# Patient Record
Sex: FEMALE | Race: WHITE | Employment: UNEMPLOYED | URBAN - METROPOLITAN AREA
[De-identification: names, ages, dates, MRNs, and addresses within clinical notes are randomized per-mention and may not be internally consistent; named-entity substitution may affect disease eponyms.]

---

## 2023-02-08 ENCOUNTER — HOSPITAL ENCOUNTER (EMERGENCY)
Age: 23
Discharge: HOME | End: 2023-02-09
Attending: EMERGENCY MEDICINE
Payer: COMMERCIAL

## 2023-02-08 VITALS
TEMPERATURE: 98.2 F | DIASTOLIC BLOOD PRESSURE: 70 MMHG | BODY MASS INDEX: 24.74 KG/M2 | WEIGHT: 148.5 LBS | RESPIRATION RATE: 18 BRPM | OXYGEN SATURATION: 100 % | SYSTOLIC BLOOD PRESSURE: 121 MMHG | HEART RATE: 101 BPM | HEIGHT: 65 IN

## 2023-02-08 DIAGNOSIS — Z86.59 HISTORY OF PANIC ATTACKS: Primary | ICD-10-CM

## 2023-02-08 DIAGNOSIS — F41.0 COMPLAINT OF PANIC ATTACK: ICD-10-CM

## 2023-02-08 PROCEDURE — 74011250637 HC RX REV CODE- 250/637: Performed by: PHYSICIAN ASSISTANT

## 2023-02-08 PROCEDURE — 99283 EMERGENCY DEPT VISIT LOW MDM: CPT

## 2023-02-08 RX ORDER — LORAZEPAM 1 MG/1
1 TABLET ORAL
Status: COMPLETED | OUTPATIENT
Start: 2023-02-08 | End: 2023-02-08

## 2023-02-08 RX ADMIN — LORAZEPAM 1 MG: 1 TABLET ORAL at 22:29

## 2023-02-08 NOTE — Clinical Note
[facility] EMERGENCY DEPT  [address] [facility] [id number] [phone]    Work/School Note    Date: 2/8/2023    To Whom It May concern:    Deangelo Landon was seen and treated today in the emergency room by the following provider(s):  Attending Provider: Rashmi Baldwin MD  Physician Assistant: Susu Landaverde, [facility]. Deangelo Landon is excused from work/school on 02/08/23 and 02/09/23. She is medically clear to return to work/school on 2/10/2023.        Sincerely,          TERENCE Allen

## 2023-02-08 NOTE — Clinical Note
[facility] EMERGENCY DEPT  [address] [facility] [id number] [phone]    Work/School Note    Date: 2/8/2023    To Whom It May concern:    Tory Younger was seen and treated today in the emergency room by the following provider(s):  Attending Provider: Nancy Esteves MD  Physician Assistant: Donna Hazel. Tory Younger is excused from work/school on 02/08/23 and 02/09/23. She is medically clear to return to work/school on 2/10/2023.        Sincerely,          TERENCE Cadet

## 2023-02-09 PROCEDURE — 90791 PSYCH DIAGNOSTIC EVALUATION: CPT

## 2023-02-09 NOTE — ED TRIAGE NOTES
Pt into ED ambulatory c/o increased panic attacks, states she gets palpitations and has trouble breathing when they appear. Pt reports she takes hydroxyzine PRN, last dose 2 hours PTA. PT speaking in complete sentences in triage.

## 2023-02-09 NOTE — ED NOTES
Pt present to ED today with CC of anxiety attacks occurring approx weekly for past 4 months. Pt reports that recently the anxiety attacks have been more frequent with pt experiencing 3 anxiety attacks this week. Pt reports taking hydroxazine at approx 1830 without receiving desired results. Pt denies SI/HI and AH/VH. Pt states, \"I'm actually the opposite of suicidal as I'm fearful of dying. \"    Pt aox4, answering questions appropriately. Pt HR slightly tachy at 101. Pt soft spoken, appears anxious, flat and withdrawn. No abnormal findings with physical assessment other than tachy HR. No acute s/sx of distress, roommate a bedside and supportive. Emergency Department Nursing Plan of Care       The Nursing Plan of Care is developed from the Nursing assessment and Emergency Department Attending provider initial evaluation. The plan of care may be reviewed in the ED Provider note.     The Plan of Care was developed with the following considerations:   Patient / Family readiness to learn indicated by:verbalized understanding  Persons(s) to be included in education: patient  Barriers to Learning/Limitations:No    Signed     Dierdre Clyde    2/8/2023   10:35 PM

## 2023-02-09 NOTE — ED PROVIDER NOTES
[facility] EMERGENCY DEPT  EMERGENCY DEPARTMENT ENCOUNTER       Pt Name: Vinicius Villagomez  MRN: [id number]  [place] 2000  Date of evaluation: 2/8/2023  Provider: TERENCE Branham   PCP: None  Note Started: 10:19 PM 2/8/23     CHIEF COMPLAINT       Chief Complaint   Patient presents with    Anxiety        HISTORY OF PRESENT ILLNESS: 1 or more elements      History From: Patient  HPI Limitations : None     Vinicius Villagomez is a 20 y.o. female who presents to the ED for evaluation of panic attacks. Patient states she has been suffering panic attacks for years and years, but states they have been worse over the past several weeks. She denies any particular life stressors, just states they are happening more frequently. States she was seen at an outpatient ER for this and prescribed hydroxyzine which she has been taking without improvement. Denies SI/HI. Nursing Notes were all reviewed and agreed with or any disagreements were addressed in the HPI. REVIEW OF SYSTEMS      Review of Systems   Constitutional:  Negative for appetite change, chills and fever. HENT:  Negative for congestion. Eyes:  Negative for pain. Respiratory:  Negative for cough and shortness of breath. Cardiovascular:  Negative for chest pain. Gastrointestinal:  Negative for abdominal pain, diarrhea, nausea and vomiting. Musculoskeletal:  Negative for neck pain and neck stiffness. Skin:  Negative for rash. Neurological:  Negative for syncope and headaches. \"panic attacks\"    Psychiatric/Behavioral:  Negative for hallucinations, self-injury and suicidal ideas. The patient is nervous/anxious. All other systems reviewed and are negative. Positives and Pertinent negatives as per HPI. PAST HISTORY     Past Medical History:  History reviewed. No pertinent past medical history. Past Surgical History:  History reviewed. No pertinent surgical history. Family History:  History reviewed.  No pertinent family history. Social History:  Social History     Tobacco Use    Smoking status: Never    Smokeless tobacco: Never   Substance Use Topics    Alcohol use: Yes     Comment: occasionally    Drug use: Never       Allergies:  No Known Allergies    CURRENT MEDICATIONS      There are no discharge medications for this patient. PHYSICAL EXAM      ED Triage Vitals [02/08/23 2051]   ED Encounter Vitals Group      /70      Pulse (Heart Rate) (!) 101      Resp Rate 18      Temp 98.2 °F (36.8 °C)      Temp src       O2 Sat (%) 97 %      Weight 148 lb 8 oz      Height 5' 5\"        Physical Exam  Constitutional:       General: She is not in acute distress. Appearance: Normal appearance. She is not toxic-appearing. HENT:      Head: Normocephalic and atraumatic. Right Ear: External ear normal.      Left Ear: External ear normal.      Nose: Nose normal.      Mouth/Throat:      Mouth: Mucous membranes are moist.   Eyes:      Conjunctiva/sclera: Conjunctivae normal.   Cardiovascular:      Rate and Rhythm: Normal rate and regular rhythm. Pulses: Normal pulses. Heart sounds: Normal heart sounds. Pulmonary:      Effort: Pulmonary effort is normal.      Breath sounds: Normal breath sounds. Abdominal:      General: There is no distension. Musculoskeletal:         General: Normal range of motion. Cervical back: Normal range of motion. Skin:     General: Skin is warm and dry. Neurological:      General: No focal deficit present. Mental Status: She is alert and oriented to person, place, and time. Psychiatric:         Attention and Perception: Attention and perception normal.         Mood and Affect: Mood is anxious. Speech: Speech normal.         Behavior: Behavior normal.         Thought Content: Thought content is not paranoid or delusional. Thought content does not include homicidal or suicidal ideation. Thought content does not include homicidal or suicidal plan.         DIAGNOSTIC RESULTS   LABS:     No results found for this or any previous visit (from the past 12 hour(s)). EKG: When ordered, EKG's are interpreted by the Emergency Department Physician in the absence of a cardiologist.  Please see their note for interpretation of EKG. RADIOLOGY:  Non-plain film images such as CT, Ultrasound and MRI are read by the radiologist. Plain radiographic images are visualized and preliminarily interpreted by the ED Provider with the below findings:          Interpretation per the Radiologist below, if available at the time of this note:     No results found. PROCEDURES   Unless otherwise noted below, none  Procedures   N/A  CRITICAL CARE TIME   N/A    EMERGENCY DEPARTMENT COURSE and DIFFERENTIAL DIAGNOSIS/MDM   Vitals:    Vitals:    02/08/23 2051 02/08/23 2355   BP: 121/70    Pulse: (!) 101    Resp: 18    Temp: 98.2 °F (36.8 °C)    SpO2: 97% 100%   Weight: 67.4 kg (148 lb 8 oz)    Height: 5' 5\" (1.651 m)         Patient was given the following medications:  Medications   LORazepam (ATIVAN) tablet 1 mg (1 mg Oral Given 2/8/23 2229)       CONSULTS: (Who and What was discussed)  None    Chronic Conditions: anxiety and panic attacks     Social Determinants affecting Dx or Tx: None    Records Reviewed (source and summary of external records): Prior medical records    MDM (CC/HPI Summary, DDx, ED Course, Reassessment, Disposition Considerations -Tests not done, Shared Decision Making, Pt Expectation of Test or Tx.):   Patient is well-appearing 79-year-old female presents ED for evaluation of increased frequency of panic attacks. States she has suffered from panic attacks for very long time, states that it is happening more frequently. States when she gets her panic attack she will have palpitations and feels like she has difficulty breathing which makes her feel more panicked. She does not have any of those symptoms at this time.   States symptoms are otherwise unchanged except for increase in frequency. Has been taking hydroxyzine without improvement. Has not yet followed up with anybody with an mental health. States she would like to talk with somebody at this time. Denies SI/HI. Given that patient is otherwise asymptomatic, do not feel as though screening labs or further imaging/work-up needed acutely here at this time. Will provide a dose of Ativan and reevaluate. Bsmart consult ordered. ED Course as of 02/09/23 0018   Wed Feb 08, 2023   2349 Patient evaluated by [facility]. Providing resources for outpatient follow-up. [TL]   Thu Feb 09, 2023   0001 Repeat vitals improved. HR 79. Patient states she is feeling much better after ativan. On re-evaluation she is smiling. States she is happy with plan for outpatient follow-up [TL]      ED Course User Index  [TL] Pedro Hatchet, PA     Patient evaluated by bsmart. See Ed course note. Shared decision making performed and care plan created together. Discussed diagnosis and treatment plan. Provided resources for outpatient follow-up. Patient is happy with this plan. I do not think the patient is a threat to herself or others. Verbal return precautions advised. Patient verbalizes understanding and agreement of current plan of care. No evidence of emergent conditions requiring further evaluation or management acutely here at this time. FINAL IMPRESSION     1. History of panic attacks    2. Complaint of panic attack          DISPOSITION/PLAN   Discharged    Discharge Note: The patient is stable for discharge home. The signs, symptoms, diagnosis, and discharge instructions have been discussed, understanding conveyed, and agreed upon. The patient is to follow up as recommended or return to ER should their symptoms worsen.       PATIENT REFERRED TO:  Follow-up Information       Follow up With Specialties Details Why [address] [facility] EMERGENCY DEPT Emergency Medicine  As needed, If symptoms worsen [address] [facility], [address] [place] [address] [address]    Blayne Mendoza MD Internal Medicine Physician   [address]  [address]      [address] [facility]    [address] [address] [phone]    [address] [address]  [phone]    [address]   [address] [address]  [phone]    Rafaela No NP Nurse Practitioner   [address] [facility] [address]  [address]  [facility]  [phone]      [facility] DEPARTMENT OF PSYCHIATRY    [address]  [phone]              DISCHARGE MEDICATIONS:  There are no discharge medications for this patient. DISCONTINUED MEDICATIONS:  There are no discharge medications for this patient. ED Attending Involvement : I have seen and evaluated the patient. My supervision physician was available for consultation. I am the Primary Clinician of Record. TERENCE James (electronically signed)    (Please note that parts of this dictation were completed with voice recognition software. Quite often unanticipated grammatical, syntax, homophones, and other interpretive errors are inadvertently transcribed by the computer software. Please disregards these errors.  Please excuse any errors that have escaped final proofreading.)

## 2023-02-09 NOTE — DISCHARGE INSTRUCTIONS
Thank You! It was a pleasure taking care of you in our Emergency Department today. We know that when you come to [address] [place] [address], you are entrusting us with your health, comfort, and safety. Our clinicians honor that trust, and truly appreciate the opportunity to care for you and your loved ones. We also value your feedback. If you receive a survey about your Emergency Department experience today, please fill it out. We care about our patients' feedback, and we listen to what you have to say. Thank you.     Shameka Adamson PA-C

## 2023-02-09 NOTE — ED NOTES
Patient has been instructed that they have been given lorazapam* which contains opioids, benzodiazepines, or other sedating drugs. Patient is aware that they  will need to refrain from driving or operating heavy machinery after taking this medication. Patient also instructed that they need to avoid drinking alcohol and using other products containing opioids, benzodiazepines, or other sedating drugs. Patient verbalized understanding.

## 2023-02-09 NOTE — BSMART NOTE
Comprehensive Assessment Form Part 1      Section I - Disposition    Axis I - Generalized Anxiety   Axis II - None   Axis III - History reviewed. No pertinent past medical history. The Medical Doctor to Psychiatrist conference was not completed. The Medical Doctor is in agreement with Psychiatrist disposition because of (reason) pt doesn't meet criteria for admission. The plan is pt will be discharged with plan to go to [facility] counseling department   The on-call Psychiatrist consulted was Dr. Jany Castillo  The admitting Psychiatrist will be Dr. Newton Aguirre   The admitting Diagnosis is N/A   The Payor source is [facility] . This writer reviewed the [address] in nursing flowsheet and risk level wasn't assigned  as of this writing. Based on this assessment, the risk of suicide is low, and the plan is discharge with pt going to counseling center on campus. Section II - Integrated Summary  Summary:  Pt came in due to having a panic attack. Pt reported that she has been having frequent panic attacks that has caused her to leave work early. Pt reported that she is having at least 2 panic attacks a week nut she is unable to pinpoint a trigger. Pt stated she they usually last about 30 minutes. She experiences heart flutters and sweating. Pt hasn't been to see a psychiatrist or therapist to address these symptoms. Pt struggled with panic attacks in high school but they weren't consistent. Pt would like resources to help with these symptoms. Pt is a Senior at [employer] discussed going to [employer] counseling department to start so that she could meet a counselor for am intake. Pt was open to this suggestion. Pt was in Er with her roommate , she has been a good support for her. Pt was a/o x4. Pt denies SI/HI. Pt is free of delusions. Pt reported she was feeling better and thankful she was able to talk tonight. The patienthas demonstrated mental capacity to provide informed consent.   The information is given by the patient. The Chief Complaint is  Panic attack . The Precipitant Factors are pt couldn't identify any . Previous Hospitalizations: None   The patient has not previously been in restraints. Current Psychiatrist and/or  is None     Lethality Assessment:    The potential for suicide noted by the following: not noted . The potential for homicide is not noted. The patient has not been a perpetrator of sexual or physical abuse. There are not pending charges. The patient is not felt to be at risk for self harm or harm to others. The attending nurse was advised not at risk     Section III - Psychosocial  The patient's overall mood and attitude is anxious . Feelings of helplessness and hopelessness are not observed. Generalized anxiety is not observed. Panic is not observed. Phobias are not observed. Obsessive compulsive tendencies are not observed. Section IV - Mental Status Exam  The patient's appearance shows no evidence of impairment. The patient's behavior shows no evidence of impairment. The patient is oriented to time, place, person and situation. The patient's speech shows no evidence of impairment. The patient's mood is anxious. The range of affect shows no evidence of impairment. The patient's thought content demonstrates no evidence of impairment. The thought process shows no evidence of impairment. The patient's perception shows no evidence of impairment. The patient's memory shows no evidence of impairment. The patient's appetite shows no evidence of impairment. The patient's sleep shows no evidence of impairment. The patient's insight shows no evidence of impairment. The patient's judgement shows no evidence of impairment. Section V - Substance Abuse  The patient is not using substances. Section VI - Living Arrangements  The patient is single. The patient lives roommate . The patient has no children.   The patient does plan to return home upon discharge. The patient does not have legal issues pending. The patient's source of income comes from employment. Judaism and cultural practices have not been voiced at this time. The patient's greatest support comes from family and roommate  and this person will be involved with the treatment. The patient has not been in an event described as horrible or outside the realm of ordinary life experience either currently or in the past.  The patient has not been a victim of sexual/physical abuse. Section VII - Other Areas of Clinical Concern  The highest grade achieved is [employer] year [employer]  with the overall quality of school experience being described as good . The patient is currently employed and speaks [place] as a primary language. The patient has no communication impairments affecting communication. The patient's preference for learning can be described as: can read and write adequately.   The patient's hearing is normal.  The patient's vision is normal.      Syed Sanchez LCSW

## 2024-01-17 ENCOUNTER — HOSPITAL ENCOUNTER (EMERGENCY)
Facility: HOSPITAL | Age: 24
Discharge: HOME | End: 2024-01-17
Attending: EMERGENCY MEDICINE

## 2024-01-17 VITALS
OXYGEN SATURATION: 100 % | WEIGHT: 160 LBS | RESPIRATION RATE: 16 BRPM | DIASTOLIC BLOOD PRESSURE: 57 MMHG | HEIGHT: 65 IN | HEART RATE: 88 BPM | BODY MASS INDEX: 26.66 KG/M2 | SYSTOLIC BLOOD PRESSURE: 112 MMHG | TEMPERATURE: 97.9 F

## 2024-01-17 DIAGNOSIS — S39.012A LUMBOSACRAL STRAIN, INITIAL ENCOUNTER: Primary | ICD-10-CM

## 2024-01-17 PROCEDURE — 6360000002 HC RX W HCPCS: Performed by: EMERGENCY MEDICINE

## 2024-01-17 PROCEDURE — 6370000000 HC RX 637 (ALT 250 FOR IP): Performed by: EMERGENCY MEDICINE

## 2024-01-17 PROCEDURE — 99284 EMERGENCY DEPT VISIT MOD MDM: CPT

## 2024-01-17 PROCEDURE — 96372 THER/PROPH/DIAG INJ SC/IM: CPT

## 2024-01-17 RX ORDER — IBUPROFEN 800 MG/1
800 TABLET ORAL EVERY 8 HOURS PRN
Qty: 24 TABLET | Refills: 0 | Status: SHIPPED | OUTPATIENT
Start: 2024-01-17

## 2024-01-17 RX ORDER — DIAZEPAM 5 MG/1
5 TABLET ORAL ONCE
Status: COMPLETED | OUTPATIENT
Start: 2024-01-17 | End: 2024-01-17

## 2024-01-17 RX ORDER — METHOCARBAMOL 750 MG/1
750 TABLET, FILM COATED ORAL 3 TIMES DAILY PRN
Qty: 15 TABLET | Refills: 0 | Status: SHIPPED | OUTPATIENT
Start: 2024-01-17 | End: 2024-01-22

## 2024-01-17 RX ORDER — KETOROLAC TROMETHAMINE 30 MG/ML
30 INJECTION, SOLUTION INTRAMUSCULAR; INTRAVENOUS ONCE
Status: COMPLETED | OUTPATIENT
Start: 2024-01-17 | End: 2024-01-17

## 2024-01-17 RX ADMIN — KETOROLAC TROMETHAMINE 30 MG: 30 INJECTION, SOLUTION INTRAMUSCULAR; INTRAVENOUS at 02:52

## 2024-01-17 RX ADMIN — DIAZEPAM 5 MG: 5 TABLET ORAL at 02:52

## 2024-01-17 ASSESSMENT — PAIN DESCRIPTION - DESCRIPTORS: DESCRIPTORS: SHARP

## 2024-01-17 ASSESSMENT — PAIN DESCRIPTION - ORIENTATION: ORIENTATION: LOWER

## 2024-01-17 ASSESSMENT — PAIN SCALES - GENERAL: PAINLEVEL_OUTOF10: 8

## 2024-01-17 ASSESSMENT — PAIN DESCRIPTION - LOCATION: LOCATION: BACK

## 2024-01-17 ASSESSMENT — PAIN - FUNCTIONAL ASSESSMENT: PAIN_FUNCTIONAL_ASSESSMENT: 0-10

## 2024-01-17 NOTE — ED TRIAGE NOTES
Lower back pain x tonight after bending over at work and hearing a \"pop\" sound. Pt ambulatory on arrival. No deformities noted. No pain meds PTA

## 2024-01-17 NOTE — ED NOTES
Pt presents ambulatory to ED with c/o lower back pain after squatting down at work to pick something up & hearing a \"pop\" sound yesterday around 1730. Pt reports pain worsened while she was trying to sleep tonight PTA, denies taking meds for pain PTA. Pt denies numbness or tingling in extremities or urinary symptoms. Pt is A&Ox4, RR even & unlabored, skin is warm & dry. Pt in NAD, updated on plan of care, call light within reach.      Emergency Department Nursing Plan of Care       The Nursing Plan of Care is developed from the Nursing assessment and Emergency Department Attending provider initial evaluation.  The plan of care may be reviewed in the “ED Provider note”.    The Plan of Care was developed with the following considerations:   Patient / Family readiness to learn indicated by:verbalized understanding  Persons(s) to be included in education: patient  Barriers to Learning/Limitations: No    Signed     Anabela Tinoco RN    1/17/2024   2:44 AM

## 2024-01-17 NOTE — ED PROVIDER NOTES
[facility] EMERGENCY DEPT  EMERGENCY DEPARTMENT ENCOUNTER       Pt Name: Brittany Alcocer  MRN: [id number]  Birthdate 2000  Date of evaluation: 1/17/2024  Provider: Param Hernandez DO   PCP: No primary care provider on file.  Note Started: 2:30 AM EST 1/17/24     CHIEF COMPLAINT       Chief Complaint   Patient presents with    Back Pain        HISTORY OF PRESENT ILLNESS: 1 or more elements      History From: Patient, History limited by: none     Brittany Alcocer is a 23 y.o. female presenting the emergency department complaining of back pain.  States she was lifting at work and then felt severe sudden onset back pain.  No radicular symptoms, no saddle anesthesia, urinary incontinence or retention.  No urinary symptoms.  Denies chance of pregnancy.       Please See MDM for Additional Details of the HPI/PMH  Nursing Notes were all reviewed and agreed with or any disagreements were addressed in the HPI.     REVIEW OF SYSTEMS        Positives and Pertinent negatives as per HPI.    PAST HISTORY     Past Medical History:  History reviewed. No pertinent past medical history.    Past Surgical History:  History reviewed. No pertinent surgical history.    Family History:  History reviewed. No pertinent family history.    Social History:  Social History     Tobacco Use    Smoking status: Never    Smokeless tobacco: Never   Substance Use Topics    Alcohol use: Yes    Drug use: Never       Allergies:  No Known Allergies    CURRENT MEDICATIONS      Discharge Medication List as of 1/17/2024  3:30 AM          SCREENINGS               No data recorded         PHYSICAL EXAM      ED Triage Vitals [01/17/24 0214]   Enc Vitals Group      BP (!) 112/57      Pulse 88      Respirations 16      Temp 97.9 °F (36.6 °C)      Temp Source Oral      SpO2 100 %      Weight - Scale 72.6 kg (160 lb)      Height 1.651 m (5' 5\")      Head Circumference       Peak Flow       Pain Score       Pain Loc       Pain Edu?       Excl. in GC?         Physical Exam  Vitals